# Patient Record
Sex: FEMALE | Race: WHITE
[De-identification: names, ages, dates, MRNs, and addresses within clinical notes are randomized per-mention and may not be internally consistent; named-entity substitution may affect disease eponyms.]

---

## 2020-01-07 ENCOUNTER — HOSPITAL ENCOUNTER (EMERGENCY)
Dept: HOSPITAL 74 - JERFT | Age: 35
Discharge: HOME | End: 2020-01-07
Payer: COMMERCIAL

## 2020-01-07 VITALS — TEMPERATURE: 98 F

## 2020-01-07 VITALS — SYSTOLIC BLOOD PRESSURE: 108 MMHG | HEART RATE: 63 BPM | DIASTOLIC BLOOD PRESSURE: 72 MMHG

## 2020-01-07 VITALS — BODY MASS INDEX: 23.8 KG/M2

## 2020-01-07 DIAGNOSIS — R42: Primary | ICD-10-CM

## 2020-01-07 NOTE — PDOC
History of Present Illness





- General


Chief Complaint: Lightheaded


Stated Complaint: DIZZINESS


Time Seen by Provider: 01/07/20 18:39


History Source: Patient


Exam Limitations: No Limitations





- History of Present Illness


Initial Comments: 





01/07/20 18:59


34-year-old female with presents presents the ED with episodic dizziness.  

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 

visual changes, weakness or headache.  Patient states pulled over and symptoms 

resolved.  Patient states was able to complete the rest of her day and then at 

home her symptoms returned again including difficulty breathing.  Patient 

denies smoking history, recent travel, leg pain, recent travel, recent surgery 

or use of exogenous estrogen.  Patient believes it is a panic attack since she 

has been feeling more anxious lately over the past few months


Timing/Duration: intermittent, resolved prior to arrival


Severity: mild, moderate


Associated Symptoms: reports: shortness of breath, other (Dizziness)





Past History





- Travel


Traveled outside of the country in the last 30 days: No


Close contact w/someone who was outside of country & ill: No





- Past Medical History


Allergies/Adverse Reactions: 


 Allergies











Allergy/AdvReac Type Severity Reaction Status Date / Time


 


No Known Allergies Allergy   Verified 01/07/20 18:27











Home Medications: 


Ambulatory Orders





NK [No Known Home Medication]  02/05/17 








Anemia: No


Asthma: No


Cancer: No


Cardiac Disorders: No


COPD: No


Diabetes: No


HTN: No


Seizures: No


Thyroid Disease: No





- Immunization History


Td Vaccination: Yes


TDAP Vaccination: Yes


Immunization Up to Date: Yes





- Psycho Social/Smoking Cessation Hx


Smoking Status: No


Smoking History: Never smoked


Years of Tobacco Use: 0


Have you smoked in the past 12 months: No


Number of Cigarettes Smoked Daily: 0


Cigars Per Day: 0


Hx Alcohol Use: No


Drug/Substance Use Hx: No


Substance Use Type: None


Hx Substance Use Treatment: No


Patient Lives Alone: No


Lives with/in: spouse/SO





**Review of Systems





- Review of Systems


Able to Perform ROS?: Yes


Constitutional: No: Symptoms Reported


HEENTM: No: Symptoms Reported


Respiratory: Yes: Shortness of Breath


Cardiac (ROS): Yes: Lightheadedness


ABD/GI: No: Symptoms Reported


: No: Symptoms Reported


Musculoskeletal: No: Symptoms Reported


Integumentary: No: Symptoms Reported


Neurological: No: Symptoms reported


Endocrine: No: Symptoms Reported





*Physical Exam





- Vital Signs


 Last Vital Signs











Temp Pulse Resp BP Pulse Ox


 


 98 F   82   18   117/75   100 


 


 01/07/20 18:24  01/07/20 18:24  01/07/20 18:24  01/07/20 18:24  01/07/20 18:24














- Physical Exam


General Appearance: Yes: Nourished, Appropriately Dressed.  No: Apparent 

Distress


HEENT: negative: Pale Conjunctivae


Neck: positive: Normal Thyroid, Supple


Respiratory/Chest: positive: Chest Tender, Lungs Clear, Normal Breath Sounds.  

negative: Respiratory Distress, Accessory Muscle Use


Cardiovascular: positive: Regular Rhythm, Regular Rate.  negative: Murmur


Gastrointestinal/Abdominal: positive: Soft.  negative: Tenderness


Integumentary: positive: Normal Color, Warm, Moist


Neurologic: positive: Normal Mood/Affect (Appropriate and calm), Motor Strength 

5/5 (Ambulatory)





**Heart Score/ECG Review





- ECG Intrepretation


Rhythm: Regular Rhythm (Normal sinus bradycardia rate 54.)





Medical Decision Making





- Medical Decision Making





01/07/20 19:03


 Chief complaint: Episodic dizziness associated shortness of breath and feeling 

anxious x months.  No PE risk factors


Exam: No reproducible chest pain lungs clear to auscultation EKG normal sinus 

rhythm.  Not mildly orthostatic


 plan: BGM will be added





01/07/20 19:23


Patient is BGM within normal limits.  Patient recommended to eat small frequent 

meals throughout the day drinking plenty of fluids patient also to follow-up 

with her PCP.





Discharge





- Discharge Information


Problems reviewed: Yes


Clinical Impression/Diagnosis: 


 Dizziness





Condition: Improved


Disposition: HOME





- Follow up/Referral


Referrals: 


Lou Tesfaye MD [Primary Care Provider] - 





- Patient Discharge Instructions


Patient Printed Discharge Instructions:  DI for Dizziness-Nonvertigo


Additional Instructions: 





Drink plenty of fluids as your blood pressure and pulse were slightly different 

when standing showing signs of possible volume depletion follow-up with your 

PCP for further management





- Post Discharge Activity

## 2020-01-08 NOTE — EKG
Test Reason : 

Blood Pressure : ***/*** mmHG

Vent. Rate : 054 BPM     Atrial Rate : 054 BPM

   P-R Int : 138 ms          QRS Dur : 084 ms

    QT Int : 398 ms       P-R-T Axes : [phone] degrees

   QTc Int : 377 ms

 

SINUS BRADYCARDIA WITH SINUS ARRHYTHMIA

OTHERWISE NORMAL ECG

WHEN COMPARED WITH ECG OF 03-MAY-2017 22:33,

NO SIGNIFICANT CHANGE WAS FOUND

Confirmed by WALLY RAMOS MD ([id number]) on 1/8/2020 11:34:27 AM

 

Referred By:             Confirmed By:WALLY RAMOS MD

## 2020-12-02 ENCOUNTER — HOSPITAL ENCOUNTER (EMERGENCY)
Dept: HOSPITAL 74 - JERFT | Age: 35
Discharge: HOME | End: 2020-12-02
Payer: COMMERCIAL

## 2020-12-02 VITALS — DIASTOLIC BLOOD PRESSURE: 58 MMHG | HEART RATE: 69 BPM | SYSTOLIC BLOOD PRESSURE: 104 MMHG

## 2020-12-02 VITALS — BODY MASS INDEX: 32.9 KG/M2

## 2020-12-02 DIAGNOSIS — S96.912A: Primary | ICD-10-CM

## 2020-12-02 DIAGNOSIS — M54.6: ICD-10-CM

## 2020-12-02 LAB
APPEARANCE UR: CLEAR
BILIRUB UR STRIP.AUTO-MCNC: NEGATIVE MG/DL
COLOR UR: YELLOW
KETONES UR QL STRIP: NEGATIVE
LEUKOCYTE ESTERASE UR QL STRIP.AUTO: NEGATIVE
NITRITE UR QL STRIP: NEGATIVE
PH UR: 8 [PH] (ref 5–8)
PROT UR QL STRIP: NEGATIVE
PROT UR QL STRIP: NEGATIVE
SP GR UR: 1.01 (ref 1.01–1.03)
UROBILINOGEN UR STRIP-MCNC: 1 MG/DL (ref 0.2–1)

## 2022-10-19 ENCOUNTER — HOSPITAL ENCOUNTER (EMERGENCY)
Dept: HOSPITAL 74 - JER | Age: 37
Discharge: HOME | End: 2022-10-19
Payer: COMMERCIAL

## 2022-10-19 VITALS
TEMPERATURE: 98.7 F | SYSTOLIC BLOOD PRESSURE: 127 MMHG | DIASTOLIC BLOOD PRESSURE: 63 MMHG | RESPIRATION RATE: 18 BRPM | HEART RATE: 90 BPM

## 2022-10-19 VITALS — BODY MASS INDEX: 21.2 KG/M2

## 2022-10-19 DIAGNOSIS — U07.1: Primary | ICD-10-CM

## 2023-04-26 ENCOUNTER — HOSPITAL ENCOUNTER (EMERGENCY)
Dept: HOSPITAL 74 - JER | Age: 38
Discharge: HOME | End: 2023-04-26
Payer: COMMERCIAL

## 2023-04-26 VITALS
DIASTOLIC BLOOD PRESSURE: 68 MMHG | TEMPERATURE: 99 F | SYSTOLIC BLOOD PRESSURE: 105 MMHG | RESPIRATION RATE: 18 BRPM | HEART RATE: 66 BPM

## 2023-04-26 VITALS — BODY MASS INDEX: 24.7 KG/M2

## 2023-04-26 DIAGNOSIS — R20.2: ICD-10-CM

## 2023-04-26 DIAGNOSIS — M79.10: ICD-10-CM

## 2023-04-26 DIAGNOSIS — R07.89: Primary | ICD-10-CM

## 2023-04-26 LAB
ANION GAP SERPL CALC-SCNC: 3 MMOL/L (ref 8–16)
BASOPHILS # BLD: 1 % (ref 0–2)
BUN SERPL-MCNC: 9.2 MG/DL (ref 7–18)
CALCIUM SERPL-MCNC: 8.6 MG/DL (ref 8.5–10.1)
CHLORIDE SERPL-SCNC: 105 MMOL/L (ref 98–107)
CO2 SERPL-SCNC: 29 MMOL/L (ref 21–32)
CREAT SERPL-MCNC: 0.6 MG/DL (ref 0.55–1.3)
DEPRECATED RDW RBC AUTO: 13 % (ref 11.6–15.6)
EOSINOPHIL # BLD: 1.1 % (ref 0–4.5)
GLUCOSE SERPL-MCNC: 89 MG/DL (ref 74–106)
HCT VFR BLD CALC: 40.7 % (ref 32.4–45.2)
HGB BLD-MCNC: 14.2 GM/DL (ref 10.7–15.3)
LYMPHOCYTES # BLD: 56.8 % (ref 8–40)
MCH RBC QN AUTO: 33.6 PG (ref 25.7–33.7)
MCHC RBC AUTO-ENTMCNC: 34.9 G/DL (ref 32–36)
MCV RBC: 96.3 FL (ref 80–96)
MONOCYTES # BLD AUTO: 10 % (ref 3.8–10.2)
NEUTROPHILS # BLD: 31.1 % (ref 42.8–82.8)
PLATELET # BLD AUTO: 260 10^3/UL (ref 134–434)
PMV BLD: 8.5 FL (ref 7.5–11.1)
RBC # BLD AUTO: 4.23 M/MM3 (ref 3.6–5.2)
SODIUM SERPL-SCNC: 137 MMOL/L (ref 136–145)
WBC # BLD AUTO: 5 K/MM3 (ref 4–10)